# Patient Record
Sex: MALE | Race: WHITE | NOT HISPANIC OR LATINO | Employment: FULL TIME | URBAN - METROPOLITAN AREA
[De-identification: names, ages, dates, MRNs, and addresses within clinical notes are randomized per-mention and may not be internally consistent; named-entity substitution may affect disease eponyms.]

---

## 2023-06-25 ENCOUNTER — HOSPITAL ENCOUNTER (EMERGENCY)
Facility: CLINIC | Age: 53
Discharge: HOME | End: 2023-06-25
Attending: EMERGENCY MEDICINE | Admitting: EMERGENCY MEDICINE
Payer: COMMERCIAL

## 2023-06-25 VITALS
SYSTOLIC BLOOD PRESSURE: 132 MMHG | HEART RATE: 78 BPM | TEMPERATURE: 98.2 F | DIASTOLIC BLOOD PRESSURE: 106 MMHG | RESPIRATION RATE: 20 BRPM | OXYGEN SATURATION: 100 %

## 2023-06-25 DIAGNOSIS — S61.012A THUMB LACERATION, LEFT, INITIAL ENCOUNTER: ICD-10-CM

## 2023-06-25 PROCEDURE — 99283 EMERGENCY DEPT VISIT LOW MDM: CPT | Mod: 25 | Performed by: EMERGENCY MEDICINE

## 2023-06-25 PROCEDURE — 12001 RPR S/N/AX/GEN/TRNK 2.5CM/<: CPT | Performed by: EMERGENCY MEDICINE

## 2023-06-25 PROCEDURE — 90715 TDAP VACCINE 7 YRS/> IM: CPT | Performed by: EMERGENCY MEDICINE

## 2023-06-25 PROCEDURE — 90471 IMMUNIZATION ADMIN: CPT | Performed by: EMERGENCY MEDICINE

## 2023-06-25 PROCEDURE — 250N000011 HC RX IP 250 OP 636: Performed by: EMERGENCY MEDICINE

## 2023-06-25 RX ADMIN — CLOSTRIDIUM TETANI TOXOID ANTIGEN (FORMALDEHYDE INACTIVATED), CORYNEBACTERIUM DIPHTHERIAE TOXOID ANTIGEN (FORMALDEHYDE INACTIVATED), BORDETELLA PERTUSSIS TOXOID ANTIGEN (GLUTARALDEHYDE INACTIVATED), BORDETELLA PERTUSSIS FILAMENTOUS HEMAGGLUTININ ANTIGEN (FORMALDEHYDE INACTIVATED), BORDETELLA PERTUSSIS PERTACTIN ANTIGEN, AND BORDETELLA PERTUSSIS FIMBRIAE 2/3 ANTIGEN 0.5 ML: 5; 2; 2.5; 5; 3; 5 INJECTION, SUSPENSION INTRAMUSCULAR at 15:45

## 2023-06-25 ASSESSMENT — ACTIVITIES OF DAILY LIVING (ADL): ADLS_ACUITY_SCORE: 35

## 2023-06-25 NOTE — DISCHARGE INSTRUCTIONS
The sutures should stay in for at least a week.  The following Monday will be 8 days.  Anytime after that would be appropriate for removal.  Usually it is just a nurse visit in the clinic.  Call to set an appointment.  Keep the wound clean and dry.  Apply antibiotic ointment and nonstick bandage daily.  Return to the emergency department if you develop new or worsening symptoms.  It was a pleasure to meet you.  Your tetanus vaccine was due as of this year.

## 2023-06-25 NOTE — ED PROVIDER NOTES
History     Chief Complaint   Patient presents with     Laceration     HPI  Bonifacio Mclaughlin is a 52 year old male who presents to the emergency department secondary to a left thumb laceration.  This occurred just prior to arrival.  He was cutting a hose on a boat motor with a razor and he pulled too hard in the emergency room and came back and cut his left thumb.  He is not sure of his tetanus status.  It bled a fair amount.  He wrapped a towel and came to the emergency department.  He can move the thumb in all directions.  No numbness or tingling.    Allergies:  Allergies   Allergen Reactions     No Known Drug Allergy        Problem List:    There are no problems to display for this patient.       Past Medical History:    History reviewed. No pertinent past medical history.    Past Surgical History:    History reviewed. No pertinent surgical history.    Family History:    History reviewed. No pertinent family history.    Social History:  Marital Status:  Single [1]  Social History     Tobacco Use     Smoking status: Every Day     Packs/day: 0.50     Types: Cigarettes     Smokeless tobacco: Never   Substance Use Topics     Alcohol use: Never     Drug use: Never        Medications:    PYRIDIUM 200 MG OR TABS          Review of Systems   All other systems reviewed and are negative.      Physical Exam   BP: (!) 132/106  Pulse: 78  Temp: 98.2  F (36.8  C)  Resp: 20  SpO2: 100 %      Physical Exam  Vitals and nursing note reviewed.   Constitutional:       General: He is not in acute distress.     Appearance: He is well-developed. He is not diaphoretic.   HENT:      Head: Normocephalic and atraumatic.      Right Ear: External ear normal.      Left Ear: External ear normal.      Nose: No congestion or rhinorrhea.   Eyes:      General: No scleral icterus.  Cardiovascular:      Rate and Rhythm: Normal rate.   Musculoskeletal:      Cervical back: Normal range of motion and neck supple.   Skin:     General: Skin is warm and  dry.      Findings: No rash.      Comments: 2.5 cm full-thickness laceration over the ulnar aspect of the left thumb.  Full range of motion of the thumb.  Bleeding is controlled.  No foreign bodies noted.   Neurological:      Mental Status: He is alert and oriented to person, place, and time.         ED Course            [facility] Procedure Note        Laceration Repair:    Performed by: Romeo Fang MD  Authorized by: Romeo Fang MD  Consent given by: Patient who states understanding of the procedure being performed after discussing the risks, benefits and alternatives.    Preparation: Patient was prepped and draped in usual sterile fashion.  Irrigation solution: saline    Body area: Left thumb  Laceration length: 2.5 cm  Contamination: The wound is not contaminated.  Foreign bodies:none  Tendon involvement: none  Anesthesia: Nerve block  Local anesthetic: , Lidocaine     1%  Anesthetic total: 3ml    Debridement: none  Skin closure: Closed with 7 x 4.0 Ethilon  Technique: interrupted  Approximation: close  Approximation difficulty: simple    Patient tolerance: Patient tolerated the procedure well with no immediate complications.     Procedures                  No results found for this or any previous visit (from the past 24 hour(s)).    Medications   Tdap (tetanus-diphtheria-acell pertussis) (ADACEL) injection 0.5 mL (0.5 mLs Intramuscular $Given 6/25/23 4753)       Assessments & Plan (with Medical Decision Making)   left thumb laceration repaired as above.  Antibiotic ointment and nonstick bandage was applied by ED tech.  Wound care and fall precautions discussed.  Suture removal in about a week.  Probably 8 days because that will be on Monday.  Return to ER precautions and fall precautions discussed.  All questions answered prior to discharge.  Last Tdap was in 2013.  That was updated today.     I have reviewed the nursing notes.    I have reviewed the findings, diagnosis, plan and need for  follow up with the patient.        New Prescriptions    No medications on file       Final diagnoses:   Thumb laceration, left, initial encounter       6/25/2023   [facility] EMERGENCY DEPT     Romeo Fang MD  06/25/23 1600

## 2023-06-25 NOTE — ED TRIAGE NOTES
Pt reports cutting his left thumb with a razor blade. Bleeding controlled at this time.      Triage Assessment     Row Name 06/25/23 8998       Triage Assessment (Adult)    Airway WDL WDL       Respiratory WDL    Respiratory WDL WDL       Skin Circulation/Temperature WDL    Skin Circulation/Temperature WDL X       Cardiac WDL    Cardiac WDL WDL       Peripheral/Neurovascular WDL    Peripheral Neurovascular WDL WDL       Cognitive/Neuro/Behavioral WDL    Cognitive/Neuro/Behavioral WDL WDL